# Patient Record
Sex: MALE | Race: OTHER | NOT HISPANIC OR LATINO | URBAN - METROPOLITAN AREA
[De-identification: names, ages, dates, MRNs, and addresses within clinical notes are randomized per-mention and may not be internally consistent; named-entity substitution may affect disease eponyms.]

---

## 2021-04-13 ENCOUNTER — OUTPATIENT (OUTPATIENT)
Dept: OUTPATIENT SERVICES | Facility: HOSPITAL | Age: 56
LOS: 1 days | End: 2021-04-13
Payer: COMMERCIAL

## 2021-04-13 DIAGNOSIS — Z20.828 CONTACT WITH AND (SUSPECTED) EXPOSURE TO OTHER VIRAL COMMUNICABLE DISEASES: ICD-10-CM

## 2021-04-13 LAB — SARS-COV-2 RNA SPEC QL NAA+PROBE: SIGNIFICANT CHANGE UP

## 2021-04-13 PROCEDURE — U0005: CPT

## 2021-04-13 PROCEDURE — U0003: CPT

## 2021-04-13 PROCEDURE — C9803: CPT

## 2021-04-14 DIAGNOSIS — Z20.828 CONTACT WITH AND (SUSPECTED) EXPOSURE TO OTHER VIRAL COMMUNICABLE DISEASES: ICD-10-CM

## 2021-04-26 ENCOUNTER — OUTPATIENT (OUTPATIENT)
Dept: OUTPATIENT SERVICES | Facility: HOSPITAL | Age: 56
LOS: 1 days | End: 2021-04-26
Payer: COMMERCIAL

## 2021-04-26 DIAGNOSIS — Z20.828 CONTACT WITH AND (SUSPECTED) EXPOSURE TO OTHER VIRAL COMMUNICABLE DISEASES: ICD-10-CM

## 2021-04-26 LAB — SARS-COV-2 RNA SPEC QL NAA+PROBE: SIGNIFICANT CHANGE UP

## 2021-04-26 PROCEDURE — U0005: CPT

## 2021-04-26 PROCEDURE — C9803: CPT

## 2021-04-26 PROCEDURE — U0003: CPT

## 2021-04-27 DIAGNOSIS — Z20.828 CONTACT WITH AND (SUSPECTED) EXPOSURE TO OTHER VIRAL COMMUNICABLE DISEASES: ICD-10-CM

## 2021-10-15 PROBLEM — Z00.00 ENCOUNTER FOR PREVENTIVE HEALTH EXAMINATION: Status: ACTIVE | Noted: 2021-10-15

## 2021-10-21 ENCOUNTER — APPOINTMENT (OUTPATIENT)
Dept: PULMONOLOGY | Facility: CLINIC | Age: 56
End: 2021-10-21
Payer: COMMERCIAL

## 2021-10-21 ENCOUNTER — NON-APPOINTMENT (OUTPATIENT)
Age: 56
End: 2021-10-21

## 2021-10-21 VITALS — SYSTOLIC BLOOD PRESSURE: 190 MMHG | DIASTOLIC BLOOD PRESSURE: 117 MMHG

## 2021-10-21 VITALS
HEART RATE: 97 BPM | BODY MASS INDEX: 34 KG/M2 | WEIGHT: 251 LBS | SYSTOLIC BLOOD PRESSURE: 193 MMHG | HEIGHT: 72 IN | DIASTOLIC BLOOD PRESSURE: 121 MMHG | OXYGEN SATURATION: 93 % | TEMPERATURE: 98.3 F

## 2021-10-21 VITALS — DIASTOLIC BLOOD PRESSURE: 126 MMHG | SYSTOLIC BLOOD PRESSURE: 174 MMHG

## 2021-10-21 VITALS — SYSTOLIC BLOOD PRESSURE: 172 MMHG | DIASTOLIC BLOOD PRESSURE: 120 MMHG

## 2021-10-21 DIAGNOSIS — M79.89 OTHER SPECIFIED SOFT TISSUE DISORDERS: ICD-10-CM

## 2021-10-21 PROCEDURE — 99204 OFFICE O/P NEW MOD 45 MIN: CPT

## 2021-10-21 RX ORDER — PREDNISONE 20 MG/1
20 TABLET ORAL
Refills: 0 | Status: ACTIVE | COMMUNITY

## 2021-10-21 RX ORDER — PANTOPRAZOLE SODIUM 40 MG/1
40 GRANULE, DELAYED RELEASE ORAL
Refills: 0 | Status: ACTIVE | COMMUNITY

## 2021-10-21 RX ORDER — MONTELUKAST SODIUM 10 MG/1
10 TABLET, FILM COATED ORAL
Refills: 0 | Status: ACTIVE | COMMUNITY

## 2021-10-21 RX ORDER — ARFORMOTEROL TARTRATE 15 UG/2ML
15 SOLUTION RESPIRATORY (INHALATION)
Refills: 0 | Status: ACTIVE | COMMUNITY

## 2021-10-21 RX ORDER — BUDESONIDE 0.5 MG/2ML
0.5 INHALANT ORAL
Refills: 0 | Status: ACTIVE | COMMUNITY

## 2021-10-21 RX ORDER — TESTOSTERONE CYPIONATE 200 MG/ML
200 VIAL (ML) INTRAMUSCULAR
Refills: 0 | Status: ACTIVE | COMMUNITY

## 2021-10-22 ENCOUNTER — NON-APPOINTMENT (OUTPATIENT)
Age: 56
End: 2021-10-22

## 2021-10-22 LAB
ALBUMIN SERPL ELPH-MCNC: 4.5 G/DL
ALP BLD-CCNC: 112 U/L
ALT SERPL-CCNC: 56 U/L
ANION GAP SERPL CALC-SCNC: 16 MMOL/L
APTT BLD: 28.3 SEC
AST SERPL-CCNC: 20 U/L
BASOPHILS # BLD AUTO: 0.07 K/UL
BASOPHILS NFR BLD AUTO: 0.5 %
BILIRUB SERPL-MCNC: 0.4 MG/DL
BUN SERPL-MCNC: 14 MG/DL
CALCIUM SERPL-MCNC: 9.7 MG/DL
CHLORIDE SERPL-SCNC: 101 MMOL/L
CO2 SERPL-SCNC: 23 MMOL/L
COVID-19 NUCLEOCAPSID  GAM ANTIBODY INTERPRETATION: NEGATIVE
CREAT SERPL-MCNC: 0.83 MG/DL
EOSINOPHIL # BLD AUTO: 0 K/UL
EOSINOPHIL NFR BLD AUTO: 0 %
GLUCOSE SERPL-MCNC: 186 MG/DL
HCT VFR BLD CALC: 58.4 %
HGB BLD-MCNC: 17.7 G/DL
IMM GRANULOCYTES NFR BLD AUTO: 3.7 %
INR PPP: 0.98 RATIO
LEGIONELLA AG UR QL: NEGATIVE
LYMPHOCYTES # BLD AUTO: 1.49 K/UL
LYMPHOCYTES NFR BLD AUTO: 9.7 %
MAN DIFF?: NORMAL
MCHC RBC-ENTMCNC: 30.3 GM/DL
MCHC RBC-ENTMCNC: 30.7 PG
MCV RBC AUTO: 101.4 FL
MONOCYTES # BLD AUTO: 0.69 K/UL
MONOCYTES NFR BLD AUTO: 4.5 %
NEUTROPHILS # BLD AUTO: 12.61 K/UL
NEUTROPHILS NFR BLD AUTO: 81.6 %
PLATELET # BLD AUTO: 178 K/UL
POTASSIUM SERPL-SCNC: 4.4 MMOL/L
PROT SERPL-MCNC: 6.8 G/DL
PT BLD: 11.6 SEC
RBC # BLD: 5.76 M/UL
RBC # FLD: 13.4 %
SARS-COV-2 AB SERPL QL IA: 0.06 INDEX
SODIUM SERPL-SCNC: 139 MMOL/L
WBC # FLD AUTO: 15.43 K/UL

## 2021-10-22 NOTE — PHYSICAL EXAM
[TextBox_11] : no pallor, congestion noted in the conjunctivae [TextBox_44] : collar size 20 in [TextBox_89] : Abdomen is firm, no guarding, no tenderness. I am unable to palpate the liver and the spleen [TextBox_105] : 2+ pitting edema on RLE, trace edema seen in the LLE, Gottron's papules on the left hand [TextBox_125] : milia seen on the abdomen, shoulders and neck.

## 2021-10-22 NOTE — PROCEDURE
[FreeTextEntry1] : Chest 9/30/21\par Overall improvement in bilateral patchy opacities, except for the right middle lobe which appears worse compared with the prior study\par \par Chest CT 9/7/21\par Impression: Bilateral patchy upper and lower lobe predominant ground glass infiltrates with some areas of atelectasis consistent with an atypical (nonbacterial) pneumonia\par No evidence of PE

## 2021-10-22 NOTE — DISCUSSION/SUMMARY
[FreeTextEntry1] : Attending's Summary:\par 10-21-21:\par no pallor, congestion noted in the conjunctivae\par collar size 20 in, no cervical adenopathy, no supraclavicular adenopathy\par no JVD, no hepatojugular reflux, no HSM\par no cervical adenopathy, no supraclavicular adenopathy\par normal s1/s2, no murmurs, rubs or gallops\par good air entry bilaterally, no wheezing, rhonchi or crackles\par no cyanosis, no clubbing, no articular manifestations, no thickening of the skin\par milia seen on the abdomen, shoulders and neck. Abdomen is firm, no guarding, no tenderness. I am unable to palpate the liver and the spleen\par skin is dry\par 2+ pitting edema on RLE, trace edema seen in the LLE\par Gottron's papules on the left hand\par no pedal edema\par \par

## 2021-10-22 NOTE — ASSESSMENT
[FreeTextEntry1] : 10-21-21:\par \par It was a pleasure to meet Paulo today in consultation. His respiratory issues are summarized:\par \par 1. Abnormal chest CT\par Paulo is a lifelong non smoker. We reviewed CT scans from 9/7/21 and 9/30/21. The tracheal shape is abnormal. There are ground glass opacities. There are mildly dilated airways. This is not an acute process. There is traction bronchiectasis. There has been no improvement radiographically. He has not had a clinical improvement over 3 weeks after being on systemic steroids and antibiotics. \par \par Differential diagnoses:\par 1. Viral pneumonia. This is not a typical radiographic picture of COVID pneumonia. COVID pneumonia is more likely to be sub-pleural and the lower lobes. In Paulo's case, the periphery is relatively spared. The upper lobes are worse, which are atypical for COVID. He has also had several COVID swabs that have been negative\par 2.Other infections. He may have had a community acquired infection, which can result in organizing pneumonia or diffuse alveolar damage. We will check urine for legionella and serum for Mycoplasma (IgG, IgM) and chlamydia. There is some perilobular fibrosis that may be seen in OP. However, most of the opacities are GGO. There is mild traction bronchiectasis with some intralobular lines suggesting that this may be evolving into a chronic process with developing reticulation and fibrosis.\par 3. Medications. He is not taking medications that are commonly implicated in causing diffuse lung disease.\par 4. Autoimmune disease. He has a family history of autoimmune disease. He reports he has had serologies tested in the hospital and they all came back negative. We have requested he get these reports to us asap.\par 5. Inhalational injury. 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 Possibilities include mineral dust pneumoconioses, mold, hypersensitivity pneumonitis\par 6. Pulmonary alveolar proteinosis. He has had exposure at a building site. Sometimes silicoproteinosis can develop from inhalation of high dust concentrations. We will, in the work up of diffuse lung disease, consider bronch with lavage with TBBx and look for ferruginous bodies, asbestos fibers and for pul alveolar proteinosis\par \par Plan:\par - Repeat chest CT w/o contrast. If it is shows improvement, we will monitor his clinical symptoms and may hold off on an invasive procedure\par - If the CT does not show significant improvement, we will schedule a bronchoscopy including looking at the airways for EDAC, BAL under polarizing light microscopy for asbestos fibers to try to discern a diagnosis, cell count, micro-organisms, . We do not feel at this time he needs to have a surgical lung biopsy. \par - We will check pre-op labs today\par - We will order PFT, 6MWT to evaluate his lung function and SpO2 while he is walking\par - Continue prednisone 20mg daily\par - Stop Levaquin\par - We have asked him to obtain the echo from the hospitalization. Would like to rule out PH\par - He has gotten rid of a down comforter. We recommend he have his house cleaned and put in a hepa filter installed in his bedroom.\par \par 2. JASBIR\par He has a history of sleep apnea. He has not been able to tolerate CPAP. I am concerned since he may be significantly hypoxemic at night, especially with his underlying lung condition. We would like to review his sleep study to see how severe his sleep apnea is. Then we can decide how urgently this needs to be addressed. I suspect he has severe hypoxemia.\par \par 3. LE swelling\par On exam there is 2+ pitting edema on RLE, trace edema seen in the LLE. \par \par Plan:\par - We will order a LE doppler to evaluate for DVT\par \par 4. R/o Pheochromocytoma\par He is flushed, has an elevated HR and is hypertensive. He called his cardiologist in the office who switched him from lisinopril to Amlodipine 5mg and Losartan 50mg.Pheochromocytoma is unlikely. However, his BP was 125 diastolic which is alarmingly high. We recommended that the patient get admitted to our ER. However he has a cardiologist and stated he would get in touch with cardiologist immediately. Risks explained to patient including CNS hemorrhage.\par \par Plan:\par - He should monitor his BP at home closely and contact his cardiologist immediately if it remains elevatedc\par - We will order urine metanephrines\par - We will check a CBC today to evaluate for polycythemia as he is on testosterone\par \par 5. Health Maintenance\par He has not gotten the COVID vaccine. We have counseled him to get the COVID vaccine, but unfortunately he is on prednisone 20mg, which will affect his immune response. We will re-discuss once he has the CT.\par \par Return to clinic: 6 weeks

## 2021-10-22 NOTE — HISTORY OF PRESENT ILLNESS
[TextBox_4] : 55 yo M presents with pmhx lifelong non smoker, high blood pressure, high cholesterol, low testosterone on replacement therapy, whooping cough in 2019\par Maternal grandfather had lung cancer\par Mother has polymyositis, raynauds, lupus\par no family or personal history of blood clots\par his sister has asthma, maternal grandmother had asthma\par he has a history of bronchitis\par Recently hospitalized at [place] for chills, congestion, coughing, hypoxia (lowest SpO2 85%).\par He was tested 6-7 times for COVID, always negative\par He has not been vaccinated for COVID.\par No sick contacts\par 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 \par Starting in April, renovating a home basement. He was exposed to rat droppings, dust from insulation, black mold\par He and his partner were sick initially then. This is the 3rd time this has happened\par No exposure to pet birds\par house he is working on has a big dog that sheds\par When he is eating and drinking, he starts coughing and has a lot of congestion. He hasn't recently seen GI or had an endoscopy\par Dr. David Nevarez, pulmonologist, [address] [place], [place] [id number]

## 2021-10-22 NOTE — REVIEW OF SYSTEMS
[Chills] : chills [Cough] : cough [SOB on Exertion] : sob on exertion [Edema] : edema [TextBox_44] : HTN

## 2021-10-23 ENCOUNTER — APPOINTMENT (OUTPATIENT)
Dept: DISASTER EMERGENCY | Facility: CLINIC | Age: 56
End: 2021-10-23

## 2021-10-24 ENCOUNTER — NON-APPOINTMENT (OUTPATIENT)
Age: 56
End: 2021-10-24

## 2021-10-24 LAB
M PNEUMO IGG SER IA-ACNC: POSITIVE
M PNEUMO IGG SER QL IA: 1.1 INDEX
M PNEUMO IGM SER QL IA: 0.05 INDEX
MYCOPLASMA AG SPEC QL: NEGATIVE
SARS-COV-2 N GENE NPH QL NAA+PROBE: NOT DETECTED

## 2021-10-25 ENCOUNTER — APPOINTMENT (OUTPATIENT)
Dept: HEMATOLOGY ONCOLOGY | Facility: CLINIC | Age: 56
End: 2021-10-25
Payer: COMMERCIAL

## 2021-10-25 ENCOUNTER — LABORATORY RESULT (OUTPATIENT)
Age: 56
End: 2021-10-25

## 2021-10-25 ENCOUNTER — OUTPATIENT (OUTPATIENT)
Dept: OUTPATIENT SERVICES | Facility: HOSPITAL | Age: 56
LOS: 1 days | End: 2021-10-25
Payer: SELF-PAY

## 2021-10-25 ENCOUNTER — APPOINTMENT (OUTPATIENT)
Dept: CT IMAGING | Facility: HOSPITAL | Age: 56
End: 2021-10-25

## 2021-10-25 VITALS
HEIGHT: 72 IN | BODY MASS INDEX: 32.91 KG/M2 | HEART RATE: 121 BPM | DIASTOLIC BLOOD PRESSURE: 116 MMHG | SYSTOLIC BLOOD PRESSURE: 161 MMHG | WEIGHT: 243 LBS | OXYGEN SATURATION: 95 % | TEMPERATURE: 95 F

## 2021-10-25 DIAGNOSIS — Z83.2 FAMILY HISTORY OF DISEASES OF THE BLOOD AND BLOOD-FORMING ORGANS AND CERTAIN DISORDERS INVOLVING THE IMMUNE MECHANISM: ICD-10-CM

## 2021-10-25 DIAGNOSIS — Z82.3 FAMILY HISTORY OF STROKE: ICD-10-CM

## 2021-10-25 DIAGNOSIS — I10 ESSENTIAL (PRIMARY) HYPERTENSION: ICD-10-CM

## 2021-10-25 DIAGNOSIS — Z80.1 FAMILY HISTORY OF MALIGNANT NEOPLASM OF TRACHEA, BRONCHUS AND LUNG: ICD-10-CM

## 2021-10-25 DIAGNOSIS — Z80.3 FAMILY HISTORY OF MALIGNANT NEOPLASM OF BREAST: ICD-10-CM

## 2021-10-25 DIAGNOSIS — Z82.49 FAMILY HISTORY OF ISCHEMIC HEART DISEASE AND OTHER DISEASES OF THE CIRCULATORY SYSTEM: ICD-10-CM

## 2021-10-25 PROCEDURE — 71250 CT THORAX DX C-: CPT | Mod: 26

## 2021-10-25 PROCEDURE — 99204 OFFICE O/P NEW MOD 45 MIN: CPT | Mod: 25

## 2021-10-25 PROCEDURE — 71250 CT THORAX DX C-: CPT

## 2021-10-25 RX ORDER — ALBUTEROL SULFATE 2.5 MG/3ML
(2.5 MG/3ML) SOLUTION RESPIRATORY (INHALATION)
Refills: 0 | Status: COMPLETED | COMMUNITY
End: 2021-10-25

## 2021-10-25 RX ORDER — LEVOFLOXACIN 500 MG/1
500 TABLET, FILM COATED ORAL
Refills: 0 | Status: COMPLETED | COMMUNITY
End: 2021-10-25

## 2021-10-25 RX ORDER — LISINOPRIL 2.5 MG/1
2.5 TABLET ORAL
Refills: 0 | Status: COMPLETED | COMMUNITY
End: 2021-10-25

## 2021-10-25 RX ORDER — LOSARTAN POTASSIUM AND HYDROCHLOROTHIAZIDE 12.5; 5 MG/1; MG/1
50-12.5 TABLET ORAL
Qty: 90 | Refills: 0 | Status: ACTIVE | COMMUNITY
Start: 2021-10-21

## 2021-10-25 RX ORDER — AMLODIPINE BESYLATE 5 MG/1
5 TABLET ORAL
Qty: 90 | Refills: 0 | Status: ACTIVE | COMMUNITY
Start: 2021-10-21

## 2021-10-25 RX ORDER — ATORVASTATIN CALCIUM 10 MG/1
10 TABLET, FILM COATED ORAL
Refills: 0 | Status: ACTIVE | COMMUNITY

## 2021-10-25 RX ORDER — ASPIRIN ENTERIC COATED TABLETS 81 MG 81 MG/1
81 TABLET, DELAYED RELEASE ORAL
Refills: 0 | Status: ACTIVE | COMMUNITY

## 2021-10-25 RX ORDER — ATORVASTATIN CALCIUM 10 MG/1
10 TABLET, FILM COATED ORAL
Refills: 0 | Status: COMPLETED | COMMUNITY
End: 2021-10-25

## 2021-10-26 ENCOUNTER — APPOINTMENT (OUTPATIENT)
Dept: PULMONOLOGY | Facility: CLINIC | Age: 56
End: 2021-10-26

## 2021-10-26 ENCOUNTER — NON-APPOINTMENT (OUTPATIENT)
Age: 56
End: 2021-10-26

## 2021-10-26 ENCOUNTER — TRANSCRIPTION ENCOUNTER (OUTPATIENT)
Age: 56
End: 2021-10-26

## 2021-10-27 ENCOUNTER — TRANSCRIPTION ENCOUNTER (OUTPATIENT)
Age: 56
End: 2021-10-27

## 2021-10-27 ENCOUNTER — NON-APPOINTMENT (OUTPATIENT)
Age: 56
End: 2021-10-27

## 2021-10-27 LAB
CHLAMYDIA AB SER-ACNC: NORMAL
CHLAMYDIA PNEUMONIAE AB IGA: NORMAL
CHLAMYDIA PNEUMONIAE AB IGG: NORMAL
CHLAMYDIA PNEUMONIAE AB IGM: NORMAL

## 2021-10-28 ENCOUNTER — TRANSCRIPTION ENCOUNTER (OUTPATIENT)
Age: 56
End: 2021-10-28

## 2021-10-28 LAB
25(OH)D3 SERPL-MCNC: 30.6 NG/ML
BASOPHILS # BLD AUTO: 0 K/UL
BASOPHILS NFR BLD AUTO: 0 %
EOSINOPHIL # BLD AUTO: 0 K/UL
EOSINOPHIL NFR BLD AUTO: 0 %
EPO SERPL-MCNC: 7.8 MIU/ML
ERYTHROCYTE [SEDIMENTATION RATE] IN BLOOD BY WESTERGREN METHOD: 30 MM/HR
FERRITIN SERPL-MCNC: 72 NG/ML
HAPTOGLOB SERPL-MCNC: 125 MG/DL
HCT VFR BLD CALC: 60.8 %
HGB BLD-MCNC: 18.9 G/DL
IRON SATN MFR SERPL: 11 %
IRON SERPL-MCNC: 44 UG/DL
LDH SERPL-CCNC: 231 U/L
LYMPHOCYTES # BLD AUTO: 1.67 K/UL
LYMPHOCYTES NFR BLD AUTO: 8.4 %
MAN DIFF?: NORMAL
MCHC RBC-ENTMCNC: 30.4 PG
MCHC RBC-ENTMCNC: 31.1 GM/DL
MCV RBC AUTO: 97.7 FL
MONOCYTES # BLD AUTO: 1.29 K/UL
MONOCYTES NFR BLD AUTO: 6.5 %
NEUTROPHILS # BLD AUTO: 16.88 K/UL
NEUTROPHILS NFR BLD AUTO: 85.1 %
PLATELET # BLD AUTO: 197 K/UL
RBC # BLD: 6.22 M/UL
RBC # FLD: 13.5 %
TESTOST SERPL-MCNC: 272 NG/DL
TIBC SERPL-MCNC: 410 UG/DL
UIBC SERPL-MCNC: 365 UG/DL
VIT B12 SERPL-MCNC: 745 PG/ML
WBC # FLD AUTO: 19.83 K/UL

## 2021-10-29 ENCOUNTER — NON-APPOINTMENT (OUTPATIENT)
Age: 56
End: 2021-10-29

## 2021-10-29 LAB
CREAT UR-MCNC: 190 MG/DL
METANEPHS 24H UR-MCNC: 215 UG/L
METANEPHS/CREAT UR: 0.5
NORMETANEPHRINE 24H UR-MCNC: 648 UG/L

## 2021-11-01 ENCOUNTER — OUTPATIENT (OUTPATIENT)
Dept: OUTPATIENT SERVICES | Facility: HOSPITAL | Age: 56
LOS: 1 days | End: 2021-11-01
Payer: COMMERCIAL

## 2021-11-01 DIAGNOSIS — D45 POLYCYTHEMIA VERA: ICD-10-CM

## 2021-11-01 PROCEDURE — 99195 PHLEBOTOMY: CPT

## 2021-11-03 ENCOUNTER — TRANSCRIPTION ENCOUNTER (OUTPATIENT)
Age: 56
End: 2021-11-03

## 2021-11-03 LAB — JAK2RLX: NORMAL

## 2021-11-03 NOTE — HISTORY OF PRESENT ILLNESS
[de-identified] : 56 years old white male was seen by pulmonary on Friday for shortness of breath... He was found to have elevated blood pressure at the time as well as elevated hematocrit... Patient has been taking subcu testosterone given to him by a PA called Aaron Perez on [place]... He has been getting the same dose in the past 10 years 1 cc every week...\par \par

## 2021-11-03 NOTE — ASSESSMENT
[FreeTextEntry1] : Discussed with patient and his sister in detail the need to have the hematocrit controlled...\par \par I will do repeat blood work including testosterone level, will arrange for phlebotomies as indicated... Patient testosterone will have to be adjusted so his hematocrit does not become elevated...\par \par Patient and his sister understand that patient's hematocrit can be controlled either by reducing the testosterone or by having phlebotomies\par \par Advised patient to get a local [facility] PCP to have his blood pressure much better controlled...\par \par He understands all the risks involved in uncontrolled high blood pressure as well as elevated hematocrit... He is taking baby aspirin for primary prevention.\par \par JAK2 was negative and patient received already 1 or 2 phlebotomies...\par \par Follow-up here in 2 to 3 months or sooner if needed.

## 2021-11-03 NOTE — CONSULT LETTER
[Dear  ___] : Dear  [unfilled], [Consult Letter:] : I had the pleasure of evaluating your patient, [unfilled]. [Please see my note below.] : Please see my note below. [Consult Closing:] : Thank you very much for allowing me to participate in the care of this patient.  If you have any questions, please do not hesitate to contact me. [Sincerely,] : Sincerely, [FreeTextEntry3] : Tiffany Carrillo MD\par

## 2021-11-05 ENCOUNTER — TRANSCRIPTION ENCOUNTER (OUTPATIENT)
Age: 56
End: 2021-11-05

## 2021-11-08 ENCOUNTER — OUTPATIENT (OUTPATIENT)
Dept: OUTPATIENT SERVICES | Facility: HOSPITAL | Age: 56
LOS: 1 days | End: 2021-11-08
Payer: COMMERCIAL

## 2021-11-08 DIAGNOSIS — D45 POLYCYTHEMIA VERA: ICD-10-CM

## 2021-11-08 PROCEDURE — 99195 PHLEBOTOMY: CPT

## 2021-11-15 ENCOUNTER — OUTPATIENT (OUTPATIENT)
Dept: OUTPATIENT SERVICES | Facility: HOSPITAL | Age: 56
LOS: 1 days | End: 2021-11-15
Payer: COMMERCIAL

## 2021-11-15 DIAGNOSIS — D45 POLYCYTHEMIA VERA: ICD-10-CM

## 2021-11-15 PROCEDURE — 99195 PHLEBOTOMY: CPT

## 2021-11-29 ENCOUNTER — TRANSCRIPTION ENCOUNTER (OUTPATIENT)
Age: 56
End: 2021-11-29

## 2021-11-29 RX ORDER — PREDNISONE 2.5 MG/1
2.5 TABLET ORAL DAILY
Qty: 90 | Refills: 0 | Status: ACTIVE | COMMUNITY
Start: 2021-10-26 | End: 1900-01-01

## 2021-12-03 ENCOUNTER — NON-APPOINTMENT (OUTPATIENT)
Age: 56
End: 2021-12-03

## 2021-12-09 ENCOUNTER — APPOINTMENT (OUTPATIENT)
Dept: PULMONOLOGY | Facility: CLINIC | Age: 56
End: 2021-12-09
Payer: COMMERCIAL

## 2021-12-09 VITALS
TEMPERATURE: 98 F | BODY MASS INDEX: 33.86 KG/M2 | RESPIRATION RATE: 12 BRPM | HEART RATE: 81 BPM | OXYGEN SATURATION: 96 % | HEIGHT: 72 IN | SYSTOLIC BLOOD PRESSURE: 123 MMHG | WEIGHT: 250 LBS | DIASTOLIC BLOOD PRESSURE: 81 MMHG

## 2021-12-09 DIAGNOSIS — D75.1 SECONDARY POLYCYTHEMIA: ICD-10-CM

## 2021-12-09 PROCEDURE — 94060 EVALUATION OF WHEEZING: CPT

## 2021-12-09 PROCEDURE — 99215 OFFICE O/P EST HI 40 MIN: CPT

## 2021-12-09 PROCEDURE — 94729 DIFFUSING CAPACITY: CPT

## 2021-12-09 PROCEDURE — 94618 PULMONARY STRESS TESTING: CPT

## 2021-12-09 PROCEDURE — ZZZZZ: CPT

## 2021-12-09 PROCEDURE — 94727 GAS DIL/WSHOT DETER LNG VOL: CPT

## 2021-12-09 NOTE — DISCUSSION/SUMMARY
[FreeTextEntry1] : Attending's Summary:\par 12-2-21:\par no pallor, no icterus\par collar size 19.5 in, no cervical adenopathy, no supraclavicular adenopathy\par no JVD, no hepatojugular reflux, no HSM\par no cervical adenopathy, no supraclavicular adenopathy\par normal s1/s2, no murmurs, rubs or gallops\par good air entry bilaterally, no wheezing, rhonchi or crackles\par no cyanosis, no clubbing, coarseness of the skin seen over the bony prominences. However, these are not suggestive of 's hands\par Good radial pulses, equal bilaterally, no visible rash\par abdomen is tense, however no tenderness elicited, no rebound tenderness\par 1+ pitting edema equal bilaterally\par no pedal edema

## 2021-12-09 NOTE — ASSESSMENT
[FreeTextEntry1] : 21:\par \par It was a pleasure to see Paulo today in follow up. His respiratory issues are summarized:\par \par 1. Abnormal chest CT\par Paulo is a lifelong non smoker. We reviewed CT scans from 21 and 21. The tracheal shape is abnormal. There are ground glass opacities. There are mildly dilated airways. This is not an acute process. There is traction bronchiectasis. There has been no improvement radiographically. He has not had a clinical improvement over 3 weeks after being on systemic steroids and antibiotics. \par \par Differential diagnoses:\par 1. Viral pneumonia. This is not a typical radiographic picture of COVID pneumonia. COVID pneumonia is more likely to be sub-pleural and the lower lobes. In Paulo's case, the periphery is relatively spared. The upper lobes are worse, which are atypical for COVID. He has also had several COVID swabs that have been negative\par 2.Other infections. He may have had a community acquired infection, which can result in organizing pneumonia or diffuse alveolar damage. We will check urine for legionella and serum for Mycoplasma (IgG, IgM) and chlamydia. There is some perilobular fibrosis that may be seen in OP. However, most of the opacities are GGO. There is mild traction bronchiectasis with some intralobular lines suggesting that this may be evolving into a chronic process with developing reticulation and fibrosis.\par 3. Medications. He is not taking medications that are commonly implicated in causing diffuse lung disease.\par 4. Autoimmune disease. He has a family history of autoimmune disease. He reports he has had serologies tested in the hospital and they all came back negative. We have requested he get these reports to us asap.\par 5. Inhalational injury. 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 Possibilities include mineral dust pneumoconioses, mold, hypersensitivity pneumonitis\par 6. Pulmonary alveolar proteinosis. He has had exposure at a building site. Sometimes silicoproteinosis can develop from inhalation of high dust concentrations. We will, in the work up of diffuse lung disease, consider bronch with lavage with TBBx and look for ferruginous bodies, asbestos fibers and for pulmonary alveolar proteinosis\par \par Chest CT done 10/25/21 shows significant improvement. We will not pursue a bronchoscopy since he had a great response to steroids. We will taper prednisone slowly. He is currently on 5mg daily.\par \par Echo during hospitalization on 21 could not evaluate RVSP and pulmonary pressure due to insufficient tricuspid regurgitation. \par \par PFT done today, 21 demonstrate mild restrictive lung defect, FVC: 3.51 L (67%), FEV1/FVC: 75%. His lung volumes are mildly reduced, T.82 L (64%). His diffusion capacity is moderately reduced, DLCO: 18.38 (58%). His 6MWT distance today, 21 was 527 meters, which is excellent. There was no desaturation.\par \par Plan:\par - Continue Prednisone 5 mg daily\par - We will switch him from Budesonide/Brovana to Breo 200.\par - He has gotten rid of a down comforter. He has not been back to the house that he was working in. There was mold and mildew found there.\par - We will repeat a chest CT mid to end 2021\par \par 2. JASBIR\par He has a history of sleep apnea. He has not been able to tolerate CPAP. I am concerned since he may be significantly hypoxemic at night, especially with his underlying lung condition. We will order a home sleep study to re-evaluate his sleep apnea. Then we can decide how urgently this needs to be addressed. I suspect he has severe hypoxemia.\par \par 3. LE swelling\par On exam last visit, there was 2+ pitting edema on RLE, trace edema seen in the LLE. LE doppler done at [facility] on 10/22/21 was negative for DVT. Today he has less edema of the lower extremity\par \par 4. R/o Pheochromocytoma\par He is flushed, has an elevated HR and is hypertensive. He called his cardiologist in the office who switched him from lisinopril to Amlodipine 5mg and Losartan 50mg. Pheochromocytoma is unlikely. However, his BP was 125 diastolic which is alarmingly high. We recommended that the patient get admitted to our ER. However he has a cardiologist and stated he would get in touch with cardiologist immediately. Risks explained to patient including CNS hemorrhage. Urine metanephrines were within normal range.\par \par Plan:\par - He will continue to monitor his BP and follow closely with his PCP\par \par 5. Elevated Hct\par CBC last visit showed elevated Hgb and hct. Repeat CBC showed values were even higher (Hgb: 18.9) and (Hct: 60.8). We referred him to Tiffany Carrillo for phlebotomy and further evaluation as he is on testosterone. He has had 3 phlebotomy treatments and is off testosterone. He had bloodwork in November showing a normal hemoglobin. He is seeing a new primary care physician and had bloodwork done this week\par \par Plan:\par - He will let us know what the Hgb and Hct are when he gets his bloodwork back\par \par 6. Abnormal stress test \par After the 3rd phlebotomy he was having severe shortness of breath and burning. His cardiology sent him for a stress test and he was found to have 97% blockage of LAD. He had a stent placed and is feeling much better. He has been started on prasugrel (Effient)\par \par 7. Health Maintenance\par He has not gotten the COVID vaccine. \par \par Return to clinic: 6 weeks

## 2021-12-09 NOTE — PROCEDURE
[FreeTextEntry1] : PFT 21\par FVC: 3.51 L (67%)--> 3.26 L (62%)  \par FEV1: 2.62 L (65%)--> 2.39 L (59%)  \par FEV1/FVC: 75%--> 73%\par XCA36-62%: 1.89 L/s (55%)--> 1.67 L/s (49%)\par T.82 L (64%)\par RV/T%\par DLCO: 18.38 (58%)\par \par 6MWT 21\par Patient walked 527 meters during a 6 minute walk test.\par Resting O2 saturation was 96% on RA.  Heart rate 81 bpm.\par End test O2 saturation was 95% on RA.  Heart rate 113 bpm.  May scale end of test: 3, moderate\par \par \par Echo 21: Normal LV size, no regional wall motion abnormalities, EF 60-65%. The left ventricular filling pattern is abnormal from impaired relaxation, diastolic dysfunction is present.\par RV size and systolic function is normal. Cannot evaluate RVSP and pulmonary pressure due to insufficient tricuspid regurgitation. \par \par Chest CT 10/25/21\par Impression:\par 1. Interval resolution of groundglass and crazy paving opacities predominantly within the right upper and right middle lobes.\par 2. Significant interval improvement in patchy groundglass opacities throughout both lungs with some residual groundglass nodules within the left lower lobe the largest measuring 9 mm. Continued short interval surveillance may be of value to confirm resolution.\par 3. Persistent diffuse mosaic attenuation pattern which increases slightly during end expiration. Abbreviated differential includes air trapping from underlying small airways inflammation. Hypersensitivity pneumonitis cannot be excluded.\par  \par Chest 21\par Overall improvement in bilateral patchy opacities, except for the right middle lobe which appears worse compared with the prior study\par \par Chest CT 21\par Impression: Bilateral patchy upper and lower lobe predominant ground glass infiltrates with some areas of atelectasis consistent with an atypical (nonbacterial) pneumonia\par No evidence of PE

## 2021-12-09 NOTE — HISTORY OF PRESENT ILLNESS
[TextBox_4] : 55 yo M presents with pmhx lifelong non smoker, high blood pressure, high cholesterol (On atorvastatin), low testosterone on replacement therapy, whooping cough in 2019\par Maternal grandfather had lung cancer\par Mother has polymyositis, raynauds, lupus\par no family or personal history of blood clots\par his sister has asthma, maternal grandmother had asthma\par he has a history of bronchitis\par Recently hospitalized at [facility] for chills, congestion, coughing, hypoxia (lowest SpO2 85%).\par He was tested 6-7 times for COVID, always negative\par He has not been vaccinated for COVID.\par No sick contacts\par 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 \par Starting in April, renovating a home basement. He was exposed to rat droppings, dust from insulation, black mold\par He and his partner were sick initially then. This is the 3rd time this has happened\par No exposure to pet birds\par house he is working on has a big dog that sheds\par When he is eating and drinking, he starts coughing and has a lot of congestion. He hasn't recently seen GI or had an endoscopy\par Dr. David Nevarez, pulmonologist, [address] [place] [address]\par \par 12-9-21:\par He had phlebotomy 3 times\par After the 3rd phlebotomy he was having severe shortness of breath and burning. His cardiology sent him for a stress test and he was found to have 97% blockage of LAD. He had a stent placed.\par He feels so much better\par He was started on prasugrel 10mg, which he will have to take for 1 year\par He is taking amlodipine 5mg, losartan 50mg and HCTZ 50-12.5.\par He was started on metoprolol 50mg BID\par He is able to walk as much as he wants now.\par He has been on prednisone 5mg daily for 2 weeks\par On November 16th, Hgb was 13. 6 after the 3rd phlebotomy\par He hasn't been on testosterone now for 2 months\par He takes Brovana and budesonide once a day. He takes Singulair once a day\par He is not working. He hasn’t been back to the house. They found mold and mildew.\par He is doing the stationary bike 20 minutes per day

## 2021-12-09 NOTE — PHYSICAL EXAM
[TextBox_11] : no pallor, congestion noted in the conjunctivae [TextBox_44] : collar size 19.5 in, no cervical adenopathy, no supraclavicular adenopathy [TextBox_89] : \par abdomen is tense, however no tenderness elicited, no rebound tenderness [TextBox_105] : coarseness of the skin seen over the bony prominences. However, these are not suggestive of 's hands, 1+ pitting edema equal bilaterally

## 2021-12-28 ENCOUNTER — APPOINTMENT (OUTPATIENT)
Dept: CT IMAGING | Facility: HOSPITAL | Age: 56
End: 2021-12-28

## 2021-12-28 ENCOUNTER — OUTPATIENT (OUTPATIENT)
Dept: OUTPATIENT SERVICES | Facility: HOSPITAL | Age: 56
LOS: 1 days | End: 2021-12-28
Payer: COMMERCIAL

## 2021-12-28 ENCOUNTER — APPOINTMENT (OUTPATIENT)
Dept: CT IMAGING | Facility: CLINIC | Age: 56
End: 2021-12-28
Payer: COMMERCIAL

## 2021-12-28 DIAGNOSIS — R93.89 ABNORMAL FINDINGS ON DIAGNOSTIC IMAGING OF OTHER SPECIFIED BODY STRUCTURES: ICD-10-CM

## 2021-12-28 PROCEDURE — 71250 CT THORAX DX C-: CPT

## 2021-12-28 PROCEDURE — 71250 CT THORAX DX C-: CPT | Mod: 26

## 2021-12-30 ENCOUNTER — NON-APPOINTMENT (OUTPATIENT)
Age: 56
End: 2021-12-30

## 2022-02-08 ENCOUNTER — APPOINTMENT (OUTPATIENT)
Dept: PULMONOLOGY | Facility: CLINIC | Age: 57
End: 2022-02-08
Payer: COMMERCIAL

## 2022-02-17 ENCOUNTER — APPOINTMENT (OUTPATIENT)
Dept: PULMONOLOGY | Facility: CLINIC | Age: 57
End: 2022-02-17
Payer: COMMERCIAL

## 2022-02-17 PROCEDURE — 99215 OFFICE O/P EST HI 40 MIN: CPT | Mod: 95

## 2022-02-17 RX ORDER — FLUTICASONE FUROATE AND VILANTEROL TRIFENATATE 200; 25 UG/1; UG/1
200-25 POWDER RESPIRATORY (INHALATION)
Qty: 1 | Refills: 2 | Status: ACTIVE | COMMUNITY
Start: 2021-12-09 | End: 1900-01-01

## 2022-02-17 NOTE — REASON FOR VISIT
[Home] : at home, [unfilled] , at the time of the visit. [Medical Office: ([facility])___] : at the medical office located in  [Family Member] : family member [Verbal consent obtained from patient] : the patient, [unfilled]

## 2022-02-18 NOTE — HISTORY OF PRESENT ILLNESS
[TextBox_4] : 55 yo M presents with pmhx lifelong non smoker, high blood pressure, high cholesterol (On atorvastatin), low testosterone on replacement therapy, whooping cough in 2019\par Maternal grandfather had lung cancer\par Mother has polymyositis, raynauds, lupus\par no family or personal history of blood clots\par his sister has asthma, maternal grandmother had asthma\par he has a history of bronchitis\par Recently hospitalized at [facility] for chills, congestion, coughing, hypoxia (lowest SpO2 85%).\par He was tested 6-7 times for COVID, always negative\par He has not been vaccinated for COVID.\par No sick contacts\par 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 \par Starting in April, renovating a home basement. He was exposed to rat droppings, dust from insulation, black mold\par He and his partner were sick initially then. This is the 3rd time this has happened\par No exposure to pet birds\par house he is working on has a big dog that sheds\par When he is eating and drinking, he starts coughing and has a lot of congestion. He hasn't recently seen GI or had an endoscopy\par Dr. David Nevarez, pulmonologist, [address] [place] [id number]\par \par \par 2-8-22:\par He tested positive for COVID on 1/14\par He went to [facility] and received Remdesivir\par He was given Dexamethasone 2mg BID. He finished this Sunday\par Some days he feels terrific and other days he feels tired\par He has been using the stationary bike for 30 minutes per day\par No reduction in exercise capacity\par he does not have a cough\par he lost his sense of taste and smell. He feels dryness in his sinuses\par \par He had phlebotomy 3 times\par After the 3rd phlebotomy he was having severe shortness of breath and burning. His cardiology sent him for a stress test and he was found to have 97% blockage of LAD. He had a stent placed.\par He feels so much better\par He was started on prasugrel 10mg, which he will have to take for 1 year\par He is taking amlodipine 5mg, losartan 50mg and HCTZ 50-12.5.\par He was started on metoprolol 50mg BID\par He is able to walk as much as he wants now.\par He takes Brovana and budesonide once a day. He takes Singulair once a day\par He is not working. He hasn’t been back to the house. They found mold and mildew.

## 2022-02-18 NOTE — PROCEDURE
[FreeTextEntry1] : Chest CT 21\par IMPRESSION:\par Progressive and now near total resolution of widespread infectious process in both lungs with residual mild lower lobe groundglass. No new groundglass or lung nodularity. \par \par PFT 21\par FVC: 3.51 L (67%)--> 3.26 L (62%)  \par FEV1: 2.62 L (65%)--> 2.39 L (59%)  \par FEV1/FVC: 75%--> 73%\par [id number]%: 1.89 L/s (55%)--> 1.67 L/s (49%)\par T.82 L (64%)\par RV/T%\par DLCO: 18.38 (58%)\par \par 6MWT 21\par Patient walked 527 meters during a 6 minute walk test.\par Resting O2 saturation was 96% on RA.  Heart rate 81 bpm.\par End test O2 saturation was 95% on RA.  Heart rate 113 bpm.  May scale end of test: 3, moderate\par \par \par Echo 21: Normal LV size, no regional wall motion abnormalities, EF 60-65%. The left ventricular filling pattern is abnormal from impaired relaxation, diastolic dysfunction is present.\par RV size and systolic function is normal. Cannot evaluate RVSP and pulmonary pressure due to insufficient tricuspid regurgitation. \par \par Chest CT 10/25/21\par Impression:\par 1. Interval resolution of groundglass and crazy paving opacities predominantly within the right upper and right middle lobes.\par 2. Significant interval improvement in patchy groundglass opacities throughout both lungs with some residual groundglass nodules within the left lower lobe the largest measuring 9 mm. Continued short interval surveillance may be of value to confirm resolution.\par 3. Persistent diffuse mosaic attenuation pattern which increases slightly during end expiration. Abbreviated differential includes air trapping from underlying small airways inflammation. Hypersensitivity pneumonitis cannot be excluded.\par  \par Chest 21\par Overall improvement in bilateral patchy opacities, except for the right middle lobe which appears worse compared with the prior study\par \par Chest CT 21\par Impression: Bilateral patchy upper and lower lobe predominant ground glass infiltrates with some areas of atelectasis consistent with an atypical (nonbacterial) pneumonia\par No evidence of PE

## 2022-02-18 NOTE — REVIEW OF SYSTEMS
[Recent Wt Loss (___ Lbs)] : ~T recent [unfilled] lb weight loss [Cough] : no cough [SOB on Exertion] : no sob on exertion [TextBox_14] : dryness [TextBox_44] : HTN

## 2022-02-18 NOTE — ASSESSMENT
[FreeTextEntry1] : 22:\par \par It was a pleasure to see Paulo today in follow up. His respiratory issues are summarized:\par \par 1. Abnormal chest CT\par Paulo is a lifelong non smoker. We reviewed CT scans from 21 and 21. The tracheal shape is abnormal. There are ground glass opacities. There are mildly dilated airways. This is not an acute process. There is traction bronchiectasis. There was no improvement radiographically.\par \par Differential diagnoses:\par 1. Viral pneumonia. This is not a typical radiographic picture of COVID pneumonia. COVID pneumonia is more likely to be sub-pleural and the lower lobes. In Paulo's case, the periphery is relatively spared. The upper lobes are worse, which are atypical for COVID. He has also had several COVID swabs that have been negative\par 2.Other infections. He may have had a community acquired infection, which can result in organizing pneumonia or diffuse alveolar damage. We will check urine for legionella and serum for Mycoplasma (IgG, IgM) and chlamydia. There is some perilobular fibrosis that may be seen in OP. However, most of the opacities are GGO. There is mild traction bronchiectasis with some intralobular lines suggesting that this may be evolving into a chronic process with developing reticulation and fibrosis.\par 3. Medications. He is not taking medications that are commonly implicated in causing diffuse lung disease.\par 4. Autoimmune disease. He has a family history of autoimmune disease. He reports he has had serologies tested in the hospital and they all came back negative. We have requested he get these reports to us asap.\par 5. Inhalational injury. 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 Possibilities include mineral dust pneumoconioses, mold, hypersensitivity pneumonitis\par 6. Pulmonary alveolar proteinosis. He has had exposure at a building site. Sometimes silicoproteinosis can develop from inhalation of high dust concentrations.\par \par Chest CT done 10/25/21 shows significant improvement. We will not pursue a bronchoscopy since he had a great response to steroids. Repeat Chest CT 21 showed almost complete resolution of ground glass opacities.\par We tapered Paulo off prednisone slowly, but unfortunately tested positive for COVID on 22 and was treated with Remdesivir and Dexamethasone. He stopped Dexamethasone on , .\par \par Echo during hospitalization on 21 could not evaluate RVSP and pulmonary pressure due to insufficient tricuspid regurgitation. \par \par PFT done 21 demonstrate mild restrictive lung defect, FVC: 3.51 L (67%), FEV1/FVC: 75%. His lung volumes are mildly reduced, T.82 L (64%). His diffusion capacity is moderately reduced, DLCO: 18.38 (58%). His 6MWT distance today, 21 was 527 meters, which is excellent. There was no desaturation.\par \par Plan:\par - Remain off steroids\par - He stopped the nebulized ICS/LABA. We advised him to start Breo 200.\par - He has gotten rid of a down comforter. He has not been back to the house that he was working in. There was mold and mildew found there.\par - Repeat PFT, 6MWT in 3 months\par - We will hold off repeating CT unless Paulo develops worsening symptoms/PFT/6MWT\par \par 2. JASBIR\par He has a history of sleep apnea. He has not been able to tolerate CPAP. I am concerned since he may be significantly hypoxemic at night, especially with his underlying lung condition. We ordered a home sleep study to re-evaluate his sleep apnea, but he has not had this done because he tested positive for COVID. Once he has this done, we can decide how urgently this needs to be addressed. I suspect he has severe hypoxemia.\par \par 3. LE swelling\par On prior visit, there was 2+ pitting edema on RLE, trace edema seen in the LLE. LE doppler done at [facility] on 10/22/21 was negative for DVT. \par \par 4. R/o Pheochromocytoma\par He is flushed, has elevated HR and is hypertensive. Pheochromocytoma is unlikely. However, his BP was 125 diastolic which is alarmingly high. We recommended that the patient get admitted to our ER. However he has a cardiologist and called him that same day. His BP regimen was changed from lisinopril to Amlodipine 5mg and Losartan 50mg.  Risks explained to patient including CNS hemorrhage. Urine metanephrines were within normal range. He has been monitoring his BP at home, on average is 140/85.\par \par Plan:\par - He will continue to monitor his BP and follow closely with his PCP\par \par 5. Elevated Hct\par CBC last visit showed elevated Hgb and hct. Repeat CBC showed values were even higher (Hgb: 18.9) and (Hct: 60.8). We referred him to [facility] for phlebotomy and further evaluation as he is on testosterone. He has had 3 phlebotomy treatments and is off testosterone. He had bloodwork in November showing a normal hemoglobin. He is seeing a new primary care physician and should follow his hemoglobin levels closely, especially if he restarts testosterone.\par \par 6. Abnormal stress test \par After the 3rd phlebotomy he was having severe shortness of breath and burning. His cardiology sent him for a stress test and he was found to have 97% blockage of LAD. He had a stent placed and is feeling much better. He has been started on prasugrel (Effient)\par \par 7. History of COVID-19 infection\par He tested positive for COVID on 22 required hospitalization at [facility]. He was treated with Remdesivir and Dexamethasone. He stopped Dexamethasone on , . \par \par Plan:\par - We have asked Paulo to send us a copy of the chest x-ray images done during his hospitalization to understand why he required treatment.\par - complains of dryness in his sinuses, recommend nasal saline rinse\par \par 8. Health Maintenance\par He has not gotten the COVID vaccine. He tested positive for COVID on 22. We have recommended he get the COVID vaccine as soon as possible.\par \par Return to clinic: 3 months (PFT, 6MWT prior)

## 2022-02-23 ENCOUNTER — TRANSCRIPTION ENCOUNTER (OUTPATIENT)
Age: 57
End: 2022-02-23

## 2022-03-01 ENCOUNTER — TRANSCRIPTION ENCOUNTER (OUTPATIENT)
Age: 57
End: 2022-03-01

## 2022-03-03 ENCOUNTER — TRANSCRIPTION ENCOUNTER (OUTPATIENT)
Age: 57
End: 2022-03-03

## 2022-03-15 ENCOUNTER — TRANSCRIPTION ENCOUNTER (OUTPATIENT)
Age: 57
End: 2022-03-15

## 2022-03-29 ENCOUNTER — OUTPATIENT (OUTPATIENT)
Dept: OUTPATIENT SERVICES | Facility: HOSPITAL | Age: 57
LOS: 1 days | End: 2022-03-29
Payer: COMMERCIAL

## 2022-03-29 ENCOUNTER — APPOINTMENT (OUTPATIENT)
Dept: SLEEP CENTER | Facility: HOME HEALTH | Age: 57
End: 2022-03-29
Payer: COMMERCIAL

## 2022-03-29 DIAGNOSIS — G47.33 OBSTRUCTIVE SLEEP APNEA (ADULT) (PEDIATRIC): ICD-10-CM

## 2022-03-29 PROCEDURE — 95800 SLP STDY UNATTENDED: CPT | Mod: 26

## 2022-03-29 PROCEDURE — 95800 SLP STDY UNATTENDED: CPT

## 2022-03-31 ENCOUNTER — TRANSCRIPTION ENCOUNTER (OUTPATIENT)
Age: 57
End: 2022-03-31

## 2022-04-05 ENCOUNTER — NON-APPOINTMENT (OUTPATIENT)
Age: 57
End: 2022-04-05

## 2022-04-25 ENCOUNTER — APPOINTMENT (OUTPATIENT)
Dept: PULMONOLOGY | Facility: CLINIC | Age: 57
End: 2022-04-25

## 2022-05-11 ENCOUNTER — NON-APPOINTMENT (OUTPATIENT)
Age: 57
End: 2022-05-11

## 2022-05-13 PROBLEM — R06.02 SHORTNESS OF BREATH: Status: ACTIVE | Noted: 2021-10-21

## 2022-05-13 PROBLEM — R93.89 ABNORMAL CHEST CT: Status: ACTIVE | Noted: 2021-10-21

## 2022-05-13 NOTE — PROCEDURE
[FreeTextEntry1] : Chest CT 21\par IMPRESSION:\par Progressive and now near total resolution of widespread infectious process in both lungs with residual mild lower lobe groundglass. No new groundglass or lung nodularity. \par \par PFT 21\par FVC: 3.51 L (67%)--> 3.26 L (62%)  \par FEV1: 2.62 L (65%)--> 2.39 L (59%)  \par FEV1/FVC: 75%--> 73%\par [id number]%: 1.89 L/s (55%)--> 1.67 L/s (49%)\par T.82 L (64%)\par RV/T%\par DLCO: 18.38 (58%)\par \par 6MWT 21\par Patient walked 527 meters during a 6 minute walk test.\par Resting O2 saturation was 96% on RA.  Heart rate 81 bpm.\par End test O2 saturation was 95% on RA.  Heart rate 113 bpm.  May scale end of test: 3, moderate\par \par \par Echo 21: Normal LV size, no regional wall motion abnormalities, EF 60-65%. The left ventricular filling pattern is abnormal from impaired relaxation, diastolic dysfunction is present.\par RV size and systolic function is normal. Cannot evaluate RVSP and pulmonary pressure due to insufficient tricuspid regurgitation. \par \par Chest CT 10/25/21\par Impression:\par 1. Interval resolution of groundglass and crazy paving opacities predominantly within the right upper and right middle lobes.\par 2. Significant interval improvement in patchy groundglass opacities throughout both lungs with some residual groundglass nodules within the left lower lobe the largest measuring 9 mm. Continued short interval surveillance may be of value to confirm resolution.\par 3. Persistent diffuse mosaic attenuation pattern which increases slightly during end expiration. Abbreviated differential includes air trapping from underlying small airways inflammation. Hypersensitivity pneumonitis cannot be excluded.\par  \par Chest 21\par Overall improvement in bilateral patchy opacities, except for the right middle lobe which appears worse compared with the prior study\par \par Chest CT 21\par Impression: Bilateral patchy upper and lower lobe predominant ground glass infiltrates with some areas of atelectasis consistent with an atypical (nonbacterial) pneumonia\par No evidence of PE

## 2022-05-13 NOTE — ASSESSMENT
[FreeTextEntry1] : 22:\par \par It was a pleasure to see Paulo today in follow up. His respiratory issues are summarized:\par \par 1. Abnormal chest CT\par Paulo is a lifelong non smoker. We reviewed CT scans from 21 and 21. The tracheal shape is abnormal. There are ground glass opacities. There are mildly dilated airways. This is not an acute process. There is traction bronchiectasis. There was no improvement radiographically.\par \par Differential diagnoses:\par 1. Viral pneumonia. This is not a typical radiographic picture of COVID pneumonia. COVID pneumonia is more likely to be sub-pleural and the lower lobes. In Paulo's case, the periphery is relatively spared. The upper lobes are worse, which are atypical for COVID. He has also had several COVID swabs that have been negative\par 2.Other infections. He may have had a community acquired infection, which can result in organizing pneumonia or diffuse alveolar damage. We will check urine for legionella and serum for Mycoplasma (IgG, IgM) and chlamydia. There is some perilobular fibrosis that may be seen in OP. However, most of the opacities are GGO. There is mild traction bronchiectasis with some intralobular lines suggesting that this may be evolving into a chronic process with developing reticulation and fibrosis.\par 3. Medications. He is not taking medications that are commonly implicated in causing diffuse lung disease.\par 4. Autoimmune disease. He has a family history of autoimmune disease. He reports he has had serologies tested in the hospital and they all came back negative. We have requested he get these reports to us asap.\par 5. Inhalational injury. 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 Possibilities include mineral dust pneumoconioses, mold, hypersensitivity pneumonitis\par 6. Pulmonary alveolar proteinosis. He has had exposure at a building site. Sometimes silicoproteinosis can develop from inhalation of high dust concentrations.\par \par Chest CT done 10/25/21 shows significant improvement. We will not pursue a bronchoscopy since he had a great response to steroids. Repeat Chest CT 21 showed almost complete resolution of ground glass opacities.\par We tapered Paulo off prednisone slowly, but unfortunately tested positive for COVID on 22 and was treated with Remdesivir and Dexamethasone. He stopped Dexamethasone on , .\par \par Echo during hospitalization on 21 could not evaluate RVSP and pulmonary pressure due to insufficient tricuspid regurgitation. \par \par PFT done 21 demonstrate mild restrictive lung defect, FVC: 3.51 L (67%), FEV1/FVC: 75%. His lung volumes are mildly reduced, T.82 L (64%). His diffusion capacity is moderately reduced, DLCO: 18.38 (58%). His 6MWT distance today, 21 was 527 meters, which is excellent. There was no desaturation.\par \par Plan:\par - Remain off steroids\par - He stopped the nebulized ICS/LABA. We advised him to start Breo 200.\par - He has gotten rid of a down comforter. He has not been back to the house that he was working in. There was mold and mildew found there.\par - Repeat PFT, 6MWT in 3 months\par - We will hold off repeating CT unless Paulo develops worsening symptoms/PFT/6MWT\par \par 2. JASBIR\par He has a history of sleep apnea. He has not been able to tolerate CPAP. I am concerned since he may be significantly hypoxemic at night, especially with his underlying lung condition. We ordered a home sleep study to re-evaluate his sleep apnea, but he has not had this done because he tested positive for COVID. Once he has this done, we can decide how urgently this needs to be addressed. I suspect he has severe hypoxemia.\par \par 3. LE swelling\par On prior visit, there was 2+ pitting edema on RLE, trace edema seen in the LLE. LE doppler done at [facility] on 10/22/21 was negative for DVT. \par \par 4. R/o Pheochromocytoma\par He is flushed, has elevated HR and is hypertensive. Pheochromocytoma is unlikely. However, his BP was 125 diastolic which is alarmingly high. We recommended that the patient get admitted to our ER. However he has a cardiologist and called him that same day. His BP regimen was changed from lisinopril to Amlodipine 5mg and Losartan 50mg.  Risks explained to patient including CNS hemorrhage. Urine metanephrines were within normal range. He has been monitoring his BP at home, on average is 140/85.\par \par Plan:\par - He will continue to monitor his BP and follow closely with his PCP\par \par 5. Elevated Hct\par CBC last visit showed elevated Hgb and hct. Repeat CBC showed values were even higher (Hgb: 18.9) and (Hct: 60.8). We referred him to [facility] for phlebotomy and further evaluation as he is on testosterone. He has had 3 phlebotomy treatments and is off testosterone. He had bloodwork in November showing a normal hemoglobin. He is seeing a new primary care physician and should follow his hemoglobin levels closely, especially if he restarts testosterone.\par \par 6. Abnormal stress test \par After the 3rd phlebotomy he was having severe shortness of breath and burning. His cardiology sent him for a stress test and he was found to have 97% blockage of LAD. He had a stent placed and is feeling much better. He has been started on prasugrel (Effient)\par \par 7. History of COVID-19 infection\par He tested positive for COVID on 22 required hospitalization at [facility]. He was treated with Remdesivir and Dexamethasone. He stopped Dexamethasone on , . \par \par Plan:\par - We have asked Paulo to send us a copy of the chest x-ray images done during his hospitalization to understand why he required treatment.\par - complains of dryness in his sinuses, recommend nasal saline rinse\par \par 8. Health Maintenance\par He has not gotten the COVID vaccine. He tested positive for COVID on 22. We have recommended he get the COVID vaccine as soon as possible.\par \par Return to clinic: 3 months (PFT, 6MWT prior)

## 2022-05-13 NOTE — REVIEW OF SYSTEMS
[Recent Wt Loss (___ Lbs)] : ~T recent [unfilled] lb weight loss [Edema] : edema [Negative] : Endocrine [Cough] : no cough [SOB on Exertion] : no sob on exertion [TextBox_14] : dryness [TextBox_44] : HTN

## 2022-05-13 NOTE — HISTORY OF PRESENT ILLNESS
[TextBox_4] : 55 yo M presents with pmhx lifelong non smoker, high blood pressure, high cholesterol (On atorvastatin), low testosterone on replacement therapy, whooping cough in 2019\par Maternal grandfather had lung cancer\par Mother has polymyositis, raynauds, lupus\par no family or personal history of blood clots\par his sister has asthma, maternal grandmother had asthma\par he has a history of bronchitis\par Recently hospitalized at [facility] for chills, congestion, coughing, hypoxia (lowest SpO2 85%).\par He was tested 6-7 times for COVID, always negative\par He has not been vaccinated for COVID.\par No sick contacts\par 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 \par Starting in April, renovating a home basement. He was exposed to rat droppings, dust from insulation, black mold\par He and his partner were sick initially then. This is the 3rd time this has happened\par No exposure to pet birds\par house he is working on has a big dog that sheds\par When he is eating and drinking, he starts coughing and has a lot of congestion. He hasn't recently seen GI or had an endoscopy\par Dr. David Nevarez, pulmonologist, [address] [place] [id number]\par \par 5-19-22:\par \par \par 2-8-22:\par He tested positive for COVID on 1/14\par He went to [facility] and received Remdesivir\par He was given Dexamethasone 2mg BID. He finished this Sunday\par Some days he feels terrific and other days he feels tired\par He has been using the stationary bike for 30 minutes per day\par No reduction in exercise capacity\par he does not have a cough\par he lost his sense of taste and smell. He feels dryness in his sinuses\par \par He had phlebotomy 3 times\par After the 3rd phlebotomy he was having severe shortness of breath and burning. His cardiology sent him for a stress test and he was found to have 97% blockage of LAD. He had a stent placed.\par He feels so much better\par He was started on prasugrel 10mg, which he will have to take for 1 year\par He is taking amlodipine 5mg, losartan 50mg and HCTZ 50-12.5.\par He was started on metoprolol 50mg BID\par He is able to walk as much as he wants now.\par He takes Brovana and budesonide once a day. He takes Singulair once a day\par He is not working. He hasn’t been back to the house. They found mold and mildew.

## 2022-05-19 ENCOUNTER — APPOINTMENT (OUTPATIENT)
Dept: PULMONOLOGY | Facility: CLINIC | Age: 57
End: 2022-05-19

## 2022-05-19 DIAGNOSIS — R06.02 SHORTNESS OF BREATH: ICD-10-CM

## 2022-05-19 DIAGNOSIS — R93.89 ABNORMAL FINDINGS ON DIAGNOSTIC IMAGING OF OTHER SPECIFIED BODY STRUCTURES: ICD-10-CM
